# Patient Record
Sex: FEMALE | Race: WHITE | HISPANIC OR LATINO | URBAN - METROPOLITAN AREA
[De-identification: names, ages, dates, MRNs, and addresses within clinical notes are randomized per-mention and may not be internally consistent; named-entity substitution may affect disease eponyms.]

---

## 2019-09-18 ENCOUNTER — APPOINTMENT (RX ONLY)
Dept: URBAN - METROPOLITAN AREA CLINIC 108 | Facility: CLINIC | Age: 34
Setting detail: DERMATOLOGY
End: 2019-09-18

## 2019-09-18 DIAGNOSIS — L81.4 OTHER MELANIN HYPERPIGMENTATION: ICD-10-CM

## 2019-09-18 DIAGNOSIS — D22 MELANOCYTIC NEVI: ICD-10-CM

## 2019-09-18 DIAGNOSIS — L72.8 OTHER FOLLICULAR CYSTS OF THE SKIN AND SUBCUTANEOUS TISSUE: ICD-10-CM

## 2019-09-18 PROBLEM — F41.9 ANXIETY DISORDER, UNSPECIFIED: Status: ACTIVE | Noted: 2019-09-18

## 2019-09-18 PROBLEM — D22.5 MELANOCYTIC NEVI OF TRUNK: Status: ACTIVE | Noted: 2019-09-18

## 2019-09-18 PROCEDURE — ? TREATMENT REGIMEN

## 2019-09-18 PROCEDURE — ? INTRALESIONAL KENALOG

## 2019-09-18 PROCEDURE — 11900 INJECT SKIN LESIONS </W 7: CPT

## 2019-09-18 PROCEDURE — ? INVENTORY

## 2019-09-18 PROCEDURE — 99203 OFFICE O/P NEW LOW 30 MIN: CPT | Mod: 25

## 2019-09-18 PROCEDURE — ? COUNSELING

## 2019-09-18 ASSESSMENT — LOCATION SIMPLE DESCRIPTION DERM
LOCATION SIMPLE: LEFT ANTERIOR NECK
LOCATION SIMPLE: CHEST

## 2019-09-18 ASSESSMENT — LOCATION DETAILED DESCRIPTION DERM
LOCATION DETAILED: LEFT INFERIOR LATERAL NECK
LOCATION DETAILED: UPPER STERNUM
LOCATION DETAILED: LEFT MEDIAL SUPERIOR CHEST

## 2019-09-18 ASSESSMENT — LOCATION ZONE DERM
LOCATION ZONE: NECK
LOCATION ZONE: TRUNK

## 2019-09-18 NOTE — PROCEDURE: INTRALESIONAL KENALOG
Requested Prescriptions     Pending Prescriptions Disp Refills    HYDROcodone-acetaminophen (NORCO) 5-325 mg per tablet 120 Tab 0     Sig: Take 1 Tab by mouth every six (6) hours as needed for Pain. Max Daily Amount: 4 Tabs.       printed and placed in PCP medication refill review folder    Last UDS date: 17  Pain contract signed: 16 ()  Last OV with Dr Christine Ireland: 17  Last OV: 17  Next Appt: 18 Lot # (Optional): [id number] Lot # For Kenalog (Optional): [id number]